# Patient Record
Sex: FEMALE | Race: WHITE | NOT HISPANIC OR LATINO | URBAN - METROPOLITAN AREA
[De-identification: names, ages, dates, MRNs, and addresses within clinical notes are randomized per-mention and may not be internally consistent; named-entity substitution may affect disease eponyms.]

---

## 2021-12-06 ENCOUNTER — LAB OUTSIDE AN ENCOUNTER (OUTPATIENT)
Dept: URBAN - METROPOLITAN AREA CLINIC 23 | Facility: CLINIC | Age: 81
End: 2021-12-06

## 2021-12-06 ENCOUNTER — OFFICE VISIT (OUTPATIENT)
Dept: URBAN - METROPOLITAN AREA CLINIC 23 | Facility: CLINIC | Age: 81
End: 2021-12-06
Payer: COMMERCIAL

## 2021-12-06 ENCOUNTER — WEB ENCOUNTER (OUTPATIENT)
Dept: URBAN - METROPOLITAN AREA CLINIC 23 | Facility: CLINIC | Age: 81
End: 2021-12-06

## 2021-12-06 DIAGNOSIS — D50.0 IRON DEFICIENCY ANEMIA DUE TO CHRONIC BLOOD LOSS: ICD-10-CM

## 2021-12-06 DIAGNOSIS — R11.2 NAUSEA AND VOMITING, UNSPECIFIED VOMITING TYPE: ICD-10-CM

## 2021-12-06 DIAGNOSIS — Z86.010 HISTORY OF COLON POLYPS: ICD-10-CM

## 2021-12-06 PROCEDURE — G8427 DOCREV CUR MEDS BY ELIG CLIN: HCPCS | Performed by: INTERNAL MEDICINE

## 2021-12-06 PROCEDURE — 1036F TOBACCO NON-USER: CPT | Performed by: INTERNAL MEDICINE

## 2021-12-06 PROCEDURE — G9903 PT SCRN TBCO ID AS NON USER: HCPCS | Performed by: INTERNAL MEDICINE

## 2021-12-06 PROCEDURE — G9622 NO UNHEAL ETOH USER: HCPCS | Performed by: INTERNAL MEDICINE

## 2021-12-06 PROCEDURE — 99204 OFFICE O/P NEW MOD 45 MIN: CPT | Performed by: INTERNAL MEDICINE

## 2021-12-06 PROCEDURE — G8420 CALC BMI NORM PARAMETERS: HCPCS | Performed by: INTERNAL MEDICINE

## 2021-12-06 PROCEDURE — 3017F COLORECTAL CA SCREEN DOC REV: CPT | Performed by: INTERNAL MEDICINE

## 2021-12-06 RX ORDER — SODIUM PICOSULFATE, MAGNESIUM OXIDE, AND ANHYDROUS CITRIC ACID 10; 3.5; 12 MG/160ML; G/160ML; G/160ML
160ML LIQUID ORAL AS DIRECTED
Qty: 320 ML | Refills: 0 | OUTPATIENT
Start: 2021-12-06 | End: 2021-12-07

## 2021-12-06 RX ORDER — PANTOPRAZOLE SODIUM 40 MG/1
1 TABLET TABLET, DELAYED RELEASE ORAL ONCE A DAY
Qty: 90 TABLET | Refills: 1 | OUTPATIENT
Start: 2021-12-06

## 2021-12-06 NOTE — HPI-TODAY'S VISIT:
81-year-old  female with chronic opioid use presents for chronic nausea.  Intermittent vomiting.  Anemia noted with primary care doctor work-up.  Intermittent black stool.  She is on Eliquis for A. fib.  Denies weight loss, frequent NSAID use.  No family history of gastric cancer or colon cancer. Chronic constipation with opioid use.  Uses Colace and milk of magnesia as needed. She was on antibiotic treatment recently, she blames the antibiotic for nausea. Nausea vomiting/poor appetite, waxing and waning.

## 2021-12-06 NOTE — PREVIOUS WORKUP REVIEWED
.ENDOSCOPIES-EGD 11/22/2013:old blood in the stomach, small amount, diffuse mild gastritis, small hiatal hernia. Mildly torturous proximal esophagus.*Pathology:gastric antrum-mild chronic active gastritis, negative for H. pylori. Gastric fundus-mild chronic inactive gastritis, negative for H. pylori.-Colonoscopy 10/30/2011:a 3 mm polyp in the ascending colon. Diffuse diverticulosis in the descending colon and sigmoid colon. A 3 mm polyp in the descending colon. Large internal hemorrhoids. Random colon biopsy taken.*Pathology:random colon-normal. Descending colon polyp-tubular adenoma. Descending colon polyp-tubular adenoma.-EGD 10/29/2011:Schatzki's ring, widely open. Moderate size hiatal hernia. Acute gastritis with erythema and erosions. Normal duodenum.*Pathology:gastric-mild chronic inflammation. Negative for H. pylori.AST 24, ALT 13. TSH 2.8. LABS-Labs 11/03/2021:WBC 8.5, hemoglobin 13.2, platelet 281, BUN 16, creatinine 0.84, sodium 139, potassium 4.4.-Labs 09/03/2021:BUN 11, creatinine 0.76, total protein 7.7, albumin 4.9, total bilirubin 0.4, alkaline phosphatase 64,  IMAGES-Gastric emptying study 12/01/2011:delayed gastric emptying. 208 minutes, upper limit 90 minutes.-Abdominal ultrasound 10/29/2011:normal liver and bile ducts. Status post cholecystectomy. CBD 3 mm. No ascites. Spleen 10.4 cm.

## 2021-12-08 LAB
H PYLORI BREATH TEST: NEGATIVE
H. PYLORI BREATH COLLECTION: (no result)

## 2021-12-24 ENCOUNTER — DASHBOARD ENCOUNTERS (OUTPATIENT)
Age: 81
End: 2021-12-24

## 2021-12-24 PROBLEM — 724556004: Status: ACTIVE | Noted: 2021-12-06

## 2021-12-24 PROBLEM — 428283002: Status: ACTIVE | Noted: 2021-12-06
